# Patient Record
(demographics unavailable — no encounter records)

---

## 2025-03-05 NOTE — ASSESSMENT
[FreeTextEntry1] : 63 y.o. Female with PMHx of Toxic MNG, s/p FARIAS in 2005, follows for Hypothyroidism. Transfers care from Dr. Thomason. Seen by Yuni Kay NP. Patient has been on stable dose of Synthroid 125 mcg daily but in 4/2024 had Total Hysterectomy due to uterine cancer. After surgery TFTs were fluctuating requiring frequent dose adjustment.  Patient here for follow up. Feels well. Denies acute issues.  Currently on Synthroid CESAR 137 mcg daily  # Postablative Hypothyroidism Clinically and biochemically euthyroid. Continue current regimen   # Hx of Pre-dm in the setting of obesity Discussed dietary and lifestyle modification Patient states she keeps good diet and physically active She is currently under evaluation for weight gain by her Oncologist .  F/u in 6 months.

## 2025-03-05 NOTE — HISTORY OF PRESENT ILLNESS
[FreeTextEntry1] : 63 y.o. Female with PMHx of Toxic MNG, s/p FARIAS in 2005, follows for Hypothyroidism. Transfers care from Dr. Thomason. Seen by Yuni Kay NP. Patient has been on stable dose of Synthroid 125 mcg daily but in 4/2024 had Total Hysterectomy due to uterine cancer. After surgery TFTs were fluctuating requiring frequent dose adjustment.

## 2025-03-05 NOTE — PHYSICAL EXAM
[Alert] : alert [Obese] : obese [No Acute Distress] : no acute distress [Well Developed] : well developed [Normal Voice/Communication] : normal voice communication [PERRL] : pupils equal, round and reactive to light [Normal Hearing] : hearing was normal [No Neck Mass] : no neck mass was observed [Thyroid Not Enlarged] : the thyroid was not enlarged [No Respiratory Distress] : no respiratory distress [Clear to Auscultation] : lungs were clear to auscultation bilaterally [Normal Rate] : heart rate was normal [Regular Rhythm] : with a regular rhythm [Soft] : abdomen soft [Normal Supraclavicular Nodes] : no supraclavicular lymphadenopathy [Normal Anterior Cervical Nodes] : no anterior cervical lymphadenopathy [Normal Reflexes] : deep tendon reflexes were 2+ and symmetric [No Tremors] : no tremors [Oriented x3] : oriented to person, place, and time [Normal Affect] : the affect was normal [Normal Insight/Judgement] : insight and judgment were intact [Normal Mood] : the mood was normal [de-identified] : Subtle LE edema noted b/l [de-identified] : Obese